# Patient Record
Sex: FEMALE | Race: WHITE | NOT HISPANIC OR LATINO | Employment: UNEMPLOYED | URBAN - METROPOLITAN AREA
[De-identification: names, ages, dates, MRNs, and addresses within clinical notes are randomized per-mention and may not be internally consistent; named-entity substitution may affect disease eponyms.]

---
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
## 2025-01-16 PROBLEM — J01.90 ACUTE SINUSITIS: Status: RESOLVED | Noted: 2023-03-17 | Resolved: 2025-01-16

## 2025-01-16 PROBLEM — J02.9 SORE THROAT: Status: RESOLVED | Noted: 2024-08-23 | Resolved: 2025-01-16

## 2025-01-16 PROBLEM — U07.1 COVID-19: Status: RESOLVED | Noted: 2023-03-17 | Resolved: 2025-01-16

## 2025-01-20 ENCOUNTER — APPOINTMENT (OUTPATIENT)
Dept: PEDIATRICS | Facility: CLINIC | Age: 20
End: 2025-01-20
Payer: COMMERCIAL

## 2025-02-08 ENCOUNTER — APPOINTMENT (OUTPATIENT)
Dept: PEDIATRICS | Facility: CLINIC | Age: 20
End: 2025-02-08
Payer: COMMERCIAL

## 2025-02-08 VITALS
BODY MASS INDEX: 22.04 KG/M2 | SYSTOLIC BLOOD PRESSURE: 105 MMHG | DIASTOLIC BLOOD PRESSURE: 74 MMHG | WEIGHT: 124.4 LBS | HEART RATE: 86 BPM | HEIGHT: 63 IN

## 2025-02-08 DIAGNOSIS — F84.0 AUTISM (HHS-HCC): ICD-10-CM

## 2025-02-08 DIAGNOSIS — M41.114 JUVENILE IDIOPATHIC SCOLIOSIS OF THORACIC REGION: ICD-10-CM

## 2025-02-08 DIAGNOSIS — Z00.129 ENCOUNTER FOR ROUTINE CHILD HEALTH EXAMINATION WITHOUT ABNORMAL FINDINGS: Primary | ICD-10-CM

## 2025-02-08 NOTE — PROGRESS NOTES
"Concerns: Tonsils stones; ringworm on scalp - occurred a month ago; anxiety and depression - causing \"brain fog\"    Sleep: Sleeping during the day and staying up at night - discussed sleep schedule  Diet:  offering a variety of food groups; fruits and vegetables; protein  [place]:  soft and regular; good urine output  Dental:  brushing twice a day and seeing dentist  School:   [employer] - studying basic classes  Activities: Works out; works at a grocery store  Drugs/Alcohol/Tobacco/Vaping: discussed   Sexuality/Puberty: discussed   Menstrual cycle:  Regular    Exam:       height is 1.607 m (5' 3.25\") and weight is 56.4 kg (124 lb 6.4 oz). Her blood pressure is 105/74 and her pulse is 86.     General: Well-developed, well-nourished, alert and oriented, no acute distress  Eyes: Normal sclera, LEILA, EOMI. Red reflex intact, light reflex symmetric.   ENT: Moist mucous membranes, normal throat, no nasal discharge. TMs are normal.  Cardiac:  Normal S1/S2, regular rhythm. Capillary refill less than 2 seconds. No clinically significant murmurs.    Pulmonary: Clear to auscultation bilaterally, no work of breathing.  GI: Soft nontender nondistended abdomen, no HSM, no masses.    Skin: No specific or unusual rashes  Neuro: Symmetric face, no ataxia, grossly normal strength.  Lymph and Neck: No lymphadenopathy, no visible thyroid swelling.  Orthopedic:  normal range of motion of shoulders and normal duck walk, Scoliosis present  :  not examined, discussed self exams.      Problem List Items Addressed This Visit             ICD-10-CM    Autism ([facility]) F84.0    Juvenile idiopathic scoliosis of thoracic region M41.114     Other Visit Diagnoses         Codes    Encounter for routine child health examination without abnormal findings    -  Primary Z00.129            Sydney is growing and developing well.  Make sure to continue wearing seat belts and helmets for riding bikes or scooters.       Now that your child has been old enough to " "drive and may have a license, continue to set a good example by wearing your seat belt and not using your phone while driving.   Teen drivers should keep their phones out of reach or in the trunk so they are not tempted to use them while driving. Parents should review online safety for their adolescent children including privacy and over-sharing.  Keep watch your your child's online interactions with concerns for bullying or inappropriate posts.     Screen time (including TV, computer, tablets, phones) should be limited to 2 hours a day to encourage activity and allow for \"in-person\" social development.    We discussed physical activity and nutritional requirements today. Continue to return annually for a checkup and any necessary booster vaccines.    Type B meningitis vaccine has been available since 2015. Type B meningitis now accounts for 30% of all meningitis cases because the original Type ACWY meningitis vaccine has worked so well. On average there are 1-2 college campuses affected per year with some cases.  We recommend this vaccine to prevent meningitis in late high school and college age children.     Continue Zoloft as directed.  WE discussed anxiety and depression at length and the need for a better sleep schedule.  If symptoms are not improving once sleep schedule is adjusted patient to call and will refer to psychiatry and/or psychology.  Scoliosis stable with no new symptoms.    No tonsil stones at this time and tonsils are a normal size.    "

## 2025-02-13 DIAGNOSIS — F93.8 OTHER CHILDHOOD EMOTIONAL DISORDERS: ICD-10-CM

## 2025-02-13 RX ORDER — SERTRALINE HYDROCHLORIDE 100 MG/1
150 TABLET, FILM COATED ORAL DAILY
Qty: 45 TABLET | Refills: 11 | Status: SHIPPED | OUTPATIENT
Start: 2025-02-13 | End: 2026-02-13

## 2025-06-16 ENCOUNTER — APPOINTMENT (OUTPATIENT)
Dept: ORTHOPEDIC SURGERY | Facility: CLINIC | Age: 20
End: 2025-06-16
Payer: COMMERCIAL

## 2025-06-19 ENCOUNTER — HOSPITAL ENCOUNTER (OUTPATIENT)
Dept: RADIOLOGY | Facility: CLINIC | Age: 20
Discharge: HOME | End: 2025-06-19
Payer: COMMERCIAL

## 2025-06-19 ENCOUNTER — OFFICE VISIT (OUTPATIENT)
Dept: ORTHOPEDIC SURGERY | Facility: CLINIC | Age: 20
End: 2025-06-19
Payer: COMMERCIAL

## 2025-06-19 DIAGNOSIS — M41.9 SCOLIOSIS, UNSPECIFIED SCOLIOSIS TYPE, UNSPECIFIED SPINAL REGION: ICD-10-CM

## 2025-06-19 PROCEDURE — 99202 OFFICE O/P NEW SF 15 MIN: CPT | Performed by: ORTHOPAEDIC SURGERY

## 2025-06-19 PROCEDURE — 1036F TOBACCO NON-USER: CPT | Performed by: ORTHOPAEDIC SURGERY

## 2025-06-19 PROCEDURE — 99214 OFFICE O/P EST MOD 30 MIN: CPT | Performed by: ORTHOPAEDIC SURGERY

## 2025-06-19 PROCEDURE — 72081 X-RAY EXAM ENTIRE SPI 1 VW: CPT

## 2025-06-19 NOTE — PROGRESS NOTES
Subjective   Patient ID: Sydney Watts is a 19 y.o. female who presents for Scoliosis and Follow-up of the Spine.    History of Present Illness  The patient is a 19-year-old female presenting for a 2-year follow-up regarding her adolescent idiopathic scoliosis.    She discontinued the use of her Smyer brace 4 years ago, which she previously wore 4 to 5 nights per week. Two years ago, she had no acute complaints or issues related to her scoliosis. Today, she reports no concerns about her scoliosis and is here for a scheduled 2-year follow-up.     She occasionally experiences soreness in her lower back, particularly when standing for extended periods at work as a  or when lifting heavy objects. Despite using cushioned shoes and a mat on the floor, she still experiences discomfort after prolonged standing. She maintains an active lifestyle, including regular gym visits and normal physical activities. She also seeks chiropractic care for adjustments when she experiences severe soreness.    SOCIAL HISTORY  Education Level: Currently a sophomore in college, attending part-time at [employer].  Occupations: Works as a .  Exercise: Goes to the gym sometimes, engages in normal physical activity.    Previous HPI:  Sydney is an 18 year old female with a history of adolescent idiopathic scoliosis who presents today for follow up. She wore a Smyer brace 4-5 nights/week - but hasn't worn it in over 2 years since it stopped fitting. She denies back pain, numbness or tingling in her legs, or changes/difficulties in her bowel or bladder function. She is approximately 4.5 years postmenarchal.      Her past medical history is positive for flexible flat feet, autism, and anxiety. She takes no medications. She has no allergies. Her 16 point review of symptoms is positive for problems with eyesight.     She will be going to [employer] next year, she is still figuring out her interests    ROS: A 16 system review  is negative for all items except as in the HPI.     Objective     There were no vitals taken for this visit.     Physical Exam  General : Well developed, well nourished female in no acute distress.     Skin: The skin is intact with no evidence of abrasions, bruises, or swelling.     She stood with level pelvis and shoulders. In the forward bend position, she had a moderate right thoracic rib hump and milder left lumbar prominence. She had good spinal mobility with right and left lateral bending, lateral rotation and lumbar extension. Her neurological examination was normal. Muscle strength was 5/5 in all muscle groups. No sensory deficits were present. No signs of clonus.   Musculoskeletal:  Lower back: Pain when standing for long periods or lifting heavy objects      Results  Imaging   - X-ray of the spine: 28-degree curve from T2-T9, 33-degree curve from T9-L1, and 14-degree curve from L1-L5. The patient is Risser 5 and the x-ray looks similar to her previous one.       Assessment & Plan  1. Adolescent idiopathic scoliosis:    Her condition remains stable with no significant changes observed in the curvature of her spine over the past two years. The growth plate has been closed for some time, indicating minimal progression of the condition. Maintaining an active lifestyle is crucial, incorporating exercises such as stretching, forward bending, weight lifting, and swimming into the routine. Avoiding a sedentary lifestyle is emphasized.  Her curve has really remained pretty stable since last visit without much change.  I think when she was standing today she was leaning a little bit for the x-ray.  When we see her again in 2 years I think is unlikely she will have a significant difference in curve magnitude, and if that is the case then we will continue with 2 to 5-year follow-up visits.  If her curve progresses, then we discussed the possibility for surgical intervention.    Follow-up  Follow up in 2 years with AP  only scoliosis x-rays.      Alessia García MD     This medical note was created with the assistance of artificial intelligence (AI) for documentation purposes. The content has been reviewed and confirmed by the healthcare provider for accuracy and completeness. Patient consented to the use of audio recording and use of AI during their visit.

## 2025-07-17 ENCOUNTER — TELEPHONE (OUTPATIENT)
Dept: ORTHOPEDIC SURGERY | Facility: HOSPITAL | Age: 20
End: 2025-07-17
Payer: COMMERCIAL

## 2025-07-17 NOTE — TELEPHONE ENCOUNTER
SYMPTOM PHONE CALL    Name of Patient: Sydney Watts  Parent or Guardian's Name: Bertha- Mom       Reason for Call: Work Note     Additional Information: Mom LVM would like a note written for patients new job at work stating she needs to be restricted with her hours per day. Patient cannot work over 6 hours. When she stands for too long her back hurts.     Please email to: [email or web address]     Call Back Number: [phone]   Previous Visit: Date 6/19/25 With Raquel   Date of Next Visit: Date Not scheduled

## 2026-02-09 ENCOUNTER — APPOINTMENT (OUTPATIENT)
Dept: PEDIATRICS | Facility: CLINIC | Age: 21
End: 2026-02-09
Payer: COMMERCIAL